# Patient Record
(demographics unavailable — no encounter records)

---

## 2024-12-18 NOTE — ASSESSMENT
[FreeTextEntry1] : #MAHA, TTP Dx 11.2024  - daughter noted over the last few days patient was not feeling well and complained of diarrhea, weakness, fatigue, worsening tremor, and progressive confusion  - no recent viral illness, reports eating raw fish on 11/10, and after developed diarrhea and progression of above symptoms. Reported consumption of tonic water ?exposure to quinine, ?drug induced  - Noted to have QUINCY on admission, labs consistent with MAHA and TTP  - Rest of work up done during hospital couse:  HIt Ab negative, dalton negative, C3 and C4 complement normal, b12 folate normal - [id number] <1 %, [id number]  inhibitor positive,  peripheral smear reviewed:+ schistocytes  - S/p eculizumab overnight on 11/15 for treatment of presumptive HUS, no further dose administered -PLEX around 13 sessions. Last session on 12.3.24  -s/p Rituximab 4cycles on 11/21, 11/28, 12/5, 12/14 -s/p methylprednisolone 1g X 3days f/by prednisone taper. Discharged from hospital with prednisone dose of 30mg -[id number]  12.11.24 49 on discharge     #Acute subacute infarcts: MRI reviewed:Small acute/subacute infarcts right centrum semiovale, right splenium  of the corpus callosum, left subinsular regio Remote hemorrhage left occipital lobe.Chronic inferior cerebellar infarction. Moderate microvascular ischemic changes Evaluated by neurology- likely 2/2 TTP, embolic to be considered as well.  Started on aspirin,statin by neurology, Has no focal deficits out-pt neurovascular clinic upon discharge   #Right ventricle linear density: Concern for vegetation in ECHO However KAM is negative  #Suspicion for cholecystitis on USG  HIDA scan negative, patient with no pain or tenderness on exam Elevated LFTs Outpatient lap lizzie with surgery  On po antibiotics   #Lung nodule - incidental  Ct chest 11.2024 :8 mm solid nodule, left lower lobe. Follow-up CT scan 6-12 months time.  #Lupus anticoagulant +  on DRVVT, neg silica clotting b2 glycoprotein, cardiolipin neg     Plan: taper prednisone to 25 mg from tomorrow till next clinic visit scheduled on  monday  Recommended to wait till 1 month prior to scheduling the surgery. F/up Dr Erickson  WIll refer to neurology  Will check hba1c, cbc, bmp, LFTs, iron ppanel, ferritin, LDH, RC  Repeat APS blood work in 3M  Repeat CT chest in 6-12M   RTC weekly for 1 month   SEEN/ examined w/HEMON[facility] fellow; note reviewed; case discussed; agree w/ plan 75 yo man with acute TTP , presenting to the hospital with acute renal failure and stroke, siezure, requiring plasma exchange course, rituxan, steroids, requiring over a month hospital stay in ICU. His plateletts count improved; his hemoglobin is imporved; kidney function has been stable. He completed 4 weekly rituxan infusions on 12/14/24; steroids started at 1mg/kg and now tapering as above; f/u in one week

## 2024-12-18 NOTE — HISTORY OF PRESENT ILLNESS
[ECOG Performance Status: 0 - Fully active, able to carry on all pre-disease performance without restriction] : Performance Status: 0 - Fully active, able to carry on all pre-disease performance without restriction [de-identified] : Mr Abraham is a 76M who presents for initial visit for new Dx of TTP at [facility].   Dx with TTP during hospitalization at [facility]. ADAMTS <1%, + inhibitor.  Received about 13 PLEX sessions, 4cycles of rituximab, stress dose steroids. Hospital course complicated by seizure requiring intubation to protect airway, acute/subacute stroke, Cholecystitis. Discharged with stable levels of plt, on prednisone taper.   Social history: no alcohol/drug use. IADL's  Family history: none significant  [de-identified] : 12.18.24 Mr Nj comes for initial visit after his recent dc from [facility] for TTP/MAHA. He is feeling well overall. He reports some exertional shortness of breath, fatigue.  CBC 12.18.24 : Hb 11.5,

## 2024-12-18 NOTE — REVIEW OF SYSTEMS
[Fatigue] : fatigue [Recent Change In Weight] : ~T recent weight change [SOB on Exertion] : shortness of breath during exertion [Negative] : Heme/Lymph [Fever] : no fever [Chills] : no chills [Night Sweats] : no night sweats [Wheezing] : no wheezing [Cough] : no cough [FreeTextEntry2] : weight loss  [FreeTextEntry6] : exertional shortness of breath

## 2024-12-23 NOTE — ASSESSMENT
[FreeTextEntry1] : #MAHA, TTP Dx 11.2024  - daughter noted over the last few days patient was not feeling well and complained of diarrhea, weakness, fatigue, worsening tremor, and progressive confusion  - no recent viral illness, reports eating raw fish on 11/10, and after developed diarrhea and progression of above symptoms. Reported consumption of tonic water ?exposure to quinine, ?drug induced  - Noted to have QUINCY on admission, labs consistent with MAHA and TTP  - Rest of work up done during hospital couse:  HIt Ab negative, dalton negative, C3 and C4 complement normal, b12 folate normal - [id number] <1 %, [id number]  inhibitor positive,  peripheral smear reviewed:+ schistocytes  - S/p eculizumab overnight on 11/15 for treatment of presumptive HUS, no further dose administered -PLEX around 13 sessions. Last session on 12.3.24  -s/p Rituximab 4cycles on 11/21, 11/28, 12/5, 12/14 -s/p methylprednisolone 1g X 3days f/by prednisone taper. Discharged from hospital with prednisone dose of 30mg -[id number]  12.11.24 49 on discharge     #Acute subacute infarcts: MRI reviewed:Small acute/subacute infarcts right centrum semiovale, right splenium  of the corpus callosum, left subinsular regio Remote hemorrhage left occipital lobe.Chronic inferior cerebellar infarction. Moderate microvascular ischemic changes Evaluated by neurology- likely 2/2 TTP, embolic to be considered as well.  Started on aspirin,statin by neurology, Has no focal deficits out-pt neurovascular clinic upon discharge   #Right ventricle linear density: Concern for vegetation in ECHO However KAM is negative  #Suspicion for cholecystitis on USG  HIDA scan negative, patient with no pain or tenderness on exam Elevated LFTs Outpatient lap lizzie with surgery  On po antibiotics   #Lung nodule - incidental  Ct chest 11.2024 :8 mm solid nodule, left lower lobe. Follow-up CT scan 6-12 months time.  #Lupus anticoagulant +  on DRVVT, neg silica clotting b2 glycoprotein, cardiolipin neg     Plan: taper prednisone to 25 mg from tomorrow till next clinic visit scheduled on  monday  Recommended to wait till 1 month prior to scheduling the surgery. F/up Dr Erickson  WIll refer to neurology  Will check hba1c, cbc, bmp, LFTs, iron ppanel, ferritin, LDH, RC  Repeat APS blood work in 3M  Repeat CT chest in 6-12M   RTC weekly for 1 month   75 yo man with acute TTP , presenting to the hospital with acute renal failure and stroke, siezure, requiring plasma exchange course, rituxan, steroids, requiring over a month hospital stay in ICU. His plateletts count improved; his hemoglobin is imporved; kidney function has been stable. He completed 4 weekly rituxan infusions on 12/14/24; steroids started at 1mg/kg and now tapering as above; f/u in one week 12/23/24: platelets are normal; GFR stable but decreased; needs to see PMD and nephrology to optimize BP and HR and volume uptake; continue tapering steroids

## 2024-12-23 NOTE — HISTORY OF PRESENT ILLNESS
[ECOG Performance Status: 0 - Fully active, able to carry on all pre-disease performance without restriction] : Performance Status: 0 - Fully active, able to carry on all pre-disease performance without restriction [de-identified] : Mr Abraham is a 76M who presents for initial visit for new Dx of TTP at [facility].   Dx with TTP during hospitalization at [facility]. ADAMTS <1%, + inhibitor.  Received about 13 PLEX sessions, 4cycles of rituximab, stress dose steroids. Hospital course complicated by seizure requiring intubation to protect airway, acute/subacute stroke, Cholecystitis. Discharged with stable levels of plt, on prednisone taper.   Social history: no alcohol/drug use. IADL's  Family history: none significant  [de-identified] : 12.18.24 Mr Nj comes for initial visit after his recent dc from [facility] for TTP/MAHA. He is feeling well overall. He reports some exertional shortness of breath, fatigue.  CBC 12.18.24 : Hb 11.5,    12/23/24

## 2024-12-23 NOTE — CONSULT LETTER
[Dear  ___] : Dear  [unfilled], [Consult Letter:] : I had the pleasure of evaluating your patient, [unfilled]. [Please see my note below.] : Please see my note below. [Sincerely,] : Sincerely, [FreeTextEntry3] : Jose Nam DO Attending Physician, Hematology/ Medical Oncology [phone] office

## 2024-12-30 NOTE — CONSULT LETTER
[Dear  ___] : Dear  [unfilled], [Consult Letter:] : I had the pleasure of evaluating your patient, [unfilled]. [Please see my note below.] : Please see my note below. [Sincerely,] : Sincerely, [FreeTextEntry3] : Jose Nam DO Attending Physician, Hematology/ Medical Oncology [phone] office

## 2024-12-30 NOTE — ASSESSMENT
[FreeTextEntry1] : #MAHA, TTP Dx 11.2024  - daughter noted over the last few days patient was not feeling well and complained of diarrhea, weakness, fatigue, worsening tremor, and progressive confusion  - no recent viral illness, reports eating raw fish on 11/10, and after developed diarrhea and progression of above symptoms. Reported consumption of tonic water ?exposure to quinine, ?drug induced  - Noted to have QUINCY on admission, labs consistent with MAHA and TTP  - Rest of work up done during hospital couse:  HIt Ab negative, dalton negative, C3 and C4 complement normal, b12 folate normal - [id number] <1 %, [id number]  inhibitor positive,  peripheral smear reviewed:+ schistocytes  - S/p eculizumab overnight on 11/15 for treatment of presumptive HUS, no further dose administered -PLEX around 13 sessions. Last session on 12.3.24  -s/p Rituximab 4cycles on 11/21, 11/28, 12/5, 12/14 -s/p methylprednisolone 1g X 3days f/by prednisone taper. Discharged from hospital with prednisone dose of 30mg -[id number]  12.11.24 49 on discharge     #Acute subacute infarcts: MRI reviewed:Small acute/subacute infarcts right centrum semiovale, right splenium  of the corpus callosum, left subinsular regio Remote hemorrhage left occipital lobe.Chronic inferior cerebellar infarction. Moderate microvascular ischemic changes Evaluated by neurology- likely 2/2 TTP, embolic to be considered as well.  Started on aspirin,statin by neurology, Has no focal deficits out-pt neurovascular clinic upon discharge   #Right ventricle linear density: Concern for vegetation in ECHO However KAM is negative  #Suspicion for cholecystitis on USG  HIDA scan negative, patient with no pain or tenderness on exam Elevated LFTs Outpatient lap lizzie with surgery  On po antibiotics   #Lung nodule - incidental  Ct chest 11.2024 :8 mm solid nodule, left lower lobe. Follow-up CT scan 6-12 months time.  #Lupus anticoagulant +  on DRVVT, neg silica clotting b2 glycoprotein, cardiolipin neg     Plan: taper prednisone to 25 mg from tomorrow till next clinic visit scheduled on  monday  Recommended to wait till 1 month prior to scheduling the surgery. F/up Dr Erickson  WIll refer to neurology  Will check hba1c, cbc, bmp, LFTs, iron ppanel, ferritin, LDH, RC  Repeat APS blood work in 3M  Repeat CT chest in 6-12M   RTC weekly for 1 month   12/30/24 1. 75 yo man with  TTP , presented to [facility] with acute renal failure and stroke, siezure, requiring plasma exchange course, s/p 4 weekly doses of rituxan completed 12/14/24;, steroids, requiring over a month hospital stay in ICU.  2. His platelets count improved/ NORMALIZED and remains so; 3.  his hemoglobin has improved; although the level today is lower than over the last week; in view of increased LDH , and in view of weakness today, would obtain full hemolysis panel today and in 3 days; pt has a f/u 1/2/25  4.  kidney function has been stable.  5.  steroids started at 1mg/kg and now tapering; the last daily dose 12/30/24 is 20 mg of prednisone; advised to decrease to daily dose of 15mg  and RTC 1/2/25 6. pt reports sneezing, tiredness which might raise suspicion of viral illness: this can cause decrease in any cell count, and mask worsening of TTP: therefore, next visit there has to be given a very strict f/u every 3-6 days with CBC, and hemolysis panel;

## 2024-12-30 NOTE — HISTORY OF PRESENT ILLNESS
[ECOG Performance Status: 0 - Fully active, able to carry on all pre-disease performance without restriction] : Performance Status: 0 - Fully active, able to carry on all pre-disease performance without restriction [de-identified] : Mr Abraham is a 76M who presents for initial visit for new Dx of TTP at [facility].   Dx with TTP during hospitalization at [facility]. ADAMTS <1%, + inhibitor.  Received about 13 PLEX sessions, 4cycles of rituximab, stress dose steroids. Hospital course complicated by seizure requiring intubation to protect airway, acute/subacute stroke, Cholecystitis. Discharged with stable levels of plt, on prednisone taper.   Social history: no alcohol/drug use. IADL's  Family history: none significant  [de-identified] : 12.18.24 Mr Nj comes for initial visit after his recent dc from [facility] for TTP/MAHA. He is feeling well overall. He reports some exertional shortness of breath, fatigue.  CBC 12.18.24 : Hb 11.5,    12/23/24

## 2024-12-30 NOTE — CONSULT LETTER
[Dear  ___] : Dear  [unfilled], [Consult Letter:] : I had the pleasure of evaluating your patient, [unfilled]. [Please see my note below.] : Please see my note below. [Sincerely,] : Sincerely, [FreeTextEntry3] : Jose Nam DO Attending Physician, Hematology/ Medical Oncology [phone] office

## 2024-12-30 NOTE — BEGINNING OF VISIT
[0] : 1) Little interest or pleasure doing things: Not at all (0) [1] : 2) Feeling down, depressed, or hopeless for several days (1) [PHQ-2 Negative] : PHQ-2 Negative [[id number]] : 1 [Pain Scale: ___] : On a scale of 1-10, today the patient's pain is a(n) [unfilled]. [Former] : Former [> 15 Years] : > 15 Years [Reviewed, no changes] : Reviewed, no changes

## 2024-12-30 NOTE — BEGINNING OF VISIT
[0] : 1) Little interest or pleasure doing things: Not at all (0) [1] : 2) Feeling down, depressed, or hopeless for several days (1) [PHQ-2 Negative] : PHQ-2 Negative [[id number]] : 1 [Pain Scale: ___] : On a scale of 1-10, today the patient's pain is a(n) [unfilled]. [Former] : Former [> 15 Years] : > 15 Years [Reviewed, no changes] : Reviewed, no changes

## 2024-12-30 NOTE — HISTORY OF PRESENT ILLNESS
[ECOG Performance Status: 0 - Fully active, able to carry on all pre-disease performance without restriction] : Performance Status: 0 - Fully active, able to carry on all pre-disease performance without restriction [de-identified] : Mr Abraham is a 76M who presents for initial visit for new Dx of TTP at [facility].   Dx with TTP during hospitalization at [facility]. ADAMTS <1%, + inhibitor.  Received about 13 PLEX sessions, 4cycles of rituximab, stress dose steroids. Hospital course complicated by seizure requiring intubation to protect airway, acute/subacute stroke, Cholecystitis. Discharged with stable levels of plt, on prednisone taper.   Social history: no alcohol/drug use. IADL's  Family history: none significant  [de-identified] : 12.18.24 Mr Nj comes for initial visit after his recent dc from [facility] for TTP/MAHA. He is feeling well overall. He reports some exertional shortness of breath, fatigue.  CBC 12.18.24 : Hb 11.5,    12/23/24

## 2024-12-30 NOTE — ASSESSMENT
[FreeTextEntry1] : #MAHA, TTP Dx 11.2024  - daughter noted over the last few days patient was not feeling well and complained of diarrhea, weakness, fatigue, worsening tremor, and progressive confusion  - no recent viral illness, reports eating raw fish on 11/10, and after developed diarrhea and progression of above symptoms. Reported consumption of tonic water ?exposure to quinine, ?drug induced  - Noted to have QUINCY on admission, labs consistent with MAHA and TTP  - Rest of work up done during hospital couse:  HIt Ab negative, dalton negative, C3 and C4 complement normal, b12 folate normal - [id number] <1 %, [id number]  inhibitor positive,  peripheral smear reviewed:+ schistocytes  - S/p eculizumab overnight on 11/15 for treatment of presumptive HUS, no further dose administered -PLEX around 13 sessions. Last session on 12.3.24  -s/p Rituximab 4cycles on 11/21, 11/28, 12/5, 12/14 -s/p methylprednisolone 1g X 3days f/by prednisone taper. Discharged from hospital with prednisone dose of 30mg -[id number]  12.11.24 49 on discharge     #Acute subacute infarcts: MRI reviewed:Small acute/subacute infarcts right centrum semiovale, right splenium  of the corpus callosum, left subinsular regio Remote hemorrhage left occipital lobe.Chronic inferior cerebellar infarction. Moderate microvascular ischemic changes Evaluated by neurology- likely 2/2 TTP, embolic to be considered as well.  Started on aspirin,statin by neurology, Has no focal deficits out-pt neurovascular clinic upon discharge   #Right ventricle linear density: Concern for vegetation in ECHO However KAM is negative  #Suspicion for cholecystitis on USG  HIDA scan negative, patient with no pain or tenderness on exam Elevated LFTs Outpatient lap lizzie with surgery  On po antibiotics   #Lung nodule - incidental  Ct chest 11.2024 :8 mm solid nodule, left lower lobe. Follow-up CT scan 6-12 months time.  #Lupus anticoagulant +  on DRVVT, neg silica clotting b2 glycoprotein, cardiolipin neg     Plan: taper prednisone to 25 mg from tomorrow till next clinic visit scheduled on  monday  Recommended to wait till 1 month prior to scheduling the surgery. F/up Dr Erickson  WIll refer to neurology  Will check hba1c, cbc, bmp, LFTs, iron ppanel, ferritin, LDH, RC  Repeat APS blood work in 3M  Repeat CT chest in 6-12M   RTC weekly for 1 month   12/30/24 1. 75 yo man with  TTP , presented to [facility] with acute renal failure and stroke, siezure, requiring plasma exchange course, s/p 4 weekly doses of rituxan completed 12/14/24;, steroids, requiring over a month hospital stay in ICU.  2. His platelets count improved/ NORMALIZED and remains so; 3.  his hemoglobin has improved; although the level today is lower than over the last week; in view of increased LDH , and in view of weakness today, would obtain full hemolysis panel today and in 3 days; pt has a f/u 1/2/25  4.  kidney function has been stable.  5.  steroids started at 1mg/kg and now tapering; the last daily dose 12/30/24 is 20 mg of prednisone; advised to decrease to daily dose of 15mg  and RTC 1/2/25 6. pt reports sneezing, tiredness which might raise suspicion of viral illness: this can cause decrease in any cell count, and mask worsening of TTP: therefore, next visit there has to be given a very strict f/u every 3-6 days with CBC, and hemolysis panel;

## 2024-12-31 NOTE — CONSULT LETTER
[Dear  ___] : Dear  [unfilled], [Consult Letter:] : I had the pleasure of evaluating your patient, [unfilled]. [Please see my note below.] : Please see my note below. [Sincerely,] : Sincerely, [FreeTextEntry3] : Jose Nam DO Attending Physician, Hematology/ Medical Oncology [phone] office

## 2024-12-31 NOTE — REVIEW OF SYSTEMS
[Fever] : no fever [Chills] : no chills [Night Sweats] : no night sweats [Fatigue] : fatigue [Recent Change In Weight] : ~T recent weight change [Wheezing] : no wheezing [Cough] : no cough [SOB on Exertion] : shortness of breath during exertion [Negative] : Heme/Lymph [FreeTextEntry2] : weight loss  [FreeTextEntry6] : exertional shortness of breath

## 2025-01-06 NOTE — HISTORY OF PRESENT ILLNESS
[FreeTextEntry1] : 77 yo M w/ PMHx of HTN, HLD, CKD3, refractory TTP, stroke, presents for hospital follow up. Patient was hospitalized in Nov 2024 for refractory TTP s/p PLEX, rituximab. Patient initially presented with generalized tonic clonic seizure and was started on Keppra 500mg BID. vEEG was negative for epileptiform activities. He had an MRI brain obtained in hospital showed acute/subacute infarct in R centrum semiovale, corpus collosum and was started on Aspirin and Lipitor.  Patient used to have HTN and tremor, for which he was on metoprolol. Has been having tremor for about 3 years, right hand only. After this hospitalization, BP dropped, metoprolol was discontinued. Restarted metoprolol 25mg QD for the last 10 days. Patient is compliant ASA 81mg QD, Lipitor 20mg QHS, Keppra 500mg BID. Patient is also on prednisone taper for TTP treatment. s/p PT. Lives with wife, able to perform daily activity independently.  Patient today complains of the hand tremor prominent in the right hand, bothering him when he holds things and ask if he needs additional management. Daughter noticed patient was moving and vocalizing in his sleep. Patient has some trouble writing, hand writing is smaller than before. Movement is slower than before. Denies abnormal taste or smell or falls. Denies heavy metal exposure. Denies FMHx of Parkinson disease.

## 2025-01-06 NOTE — CONSULT LETTER
[FreeTextEntry3] : Jose Nam DO Attending Physician, Hematology/ Medical Oncology [phone] office

## 2025-01-06 NOTE — PHYSICAL EXAM
[General Appearance - Alert] : alert [General Appearance - In No Acute Distress] : in no acute distress [Oriented To Time, Place, And Person] : oriented to person, place, and time [Person] : oriented to person [Place] : oriented to place [Time] : oriented to time [Cranial Nerves Optic (II)] : visual acuity intact bilaterally,  visual fields full to confrontation, pupils equal round and reactive to light [Cranial Nerves Trigeminal (V)] : facial sensation intact symmetrically [Cranial Nerves Oculomotor (III)] : extraocular motion intact [Cranial Nerves Facial (VII)] : face symmetrical [Cranial Nerves Vestibulocochlear (VIII)] : hearing was intact bilaterally [Cranial Nerves Glossopharyngeal (IX)] : tongue and palate midline [Cranial Nerves Accessory (XI - Cranial And Spinal)] : head turning and shoulder shrug symmetric [Cranial Nerves Hypoglossal (XII)] : there was no tongue deviation with protrusion [Motor Strength] : muscle strength was normal in all four extremities [No Muscle Atrophy] : normal bulk in all four extremities [Sensation Tactile Decrease] : light touch was intact [Balance] : balance was intact [Tremor] : a tremor present [2+] : Ankle jerk left 2+ [PERRL With Normal Accommodation] : pupils were equal in size, round, reactive to light, with normal accommodation [Extraocular Movements] : extraocular movements were intact [Hearing Threshold Finger Rub Not Wilson] : hearing was normal [Neck Appearance] : the appearance of the neck was normal [] : no respiratory distress [Heart Rate And Rhythm] : heart rate was normal and rhythm regular [Abdomen Soft] : soft [Skin Turgor] : normal skin turgor [Affect] : the affect was normal [Fluency] : fluency intact [Comprehension] : comprehension intact [FreeTextEntry5] : Hypophonia, hypomimia [FreeTextEntry6] : Resting tremor and re-emergence tremor on the right hand. Mild rigidity at left elbow/wrist. Slow tapping of the left hand and left foot. Strength 5/5 throughout. [FreeTextEntry8] : Decreased arm swing on the left arm while walking. Gait narrow based, 2 step turn.

## 2025-01-06 NOTE — REVIEW OF SYSTEMS
[Fever] : no fever [Chills] : no chills [Night Sweats] : no night sweats [Wheezing] : no wheezing [Cough] : no cough [FreeTextEntry2] : weight loss  [FreeTextEntry6] : exertional shortness of breath

## 2025-01-06 NOTE — REVIEW OF SYSTEMS
[Fever] : no fever [Chills] : no chills [Night Sweats] : no night sweats [Wheezing] : no wheezing [Cough] : no cough [FreeTextEntry6] : exertional shortness of breath  [FreeTextEntry2] : weight loss

## 2025-01-06 NOTE — ASSESSMENT
[FreeTextEntry1] : #MAHA, TTP Dx 11.2024  - daughter noted over the last few days patient was not feeling well and complained of diarrhea, weakness, fatigue, worsening tremor, and progressive confusion  - no recent viral illness, reports eating raw fish on 11/10, and after developed diarrhea and progression of above symptoms. Reported consumption of tonic water ?exposure to quinine, ?drug induced  - Noted to have QUINCY on admission, labs consistent with MAHA and TTP  - Rest of work up done during hospital couse:  HIt Ab negative, dalton negative, C3 and C4 complement normal, b12 folate normal - [id number] <1 %, [id number]  inhibitor positive,  peripheral smear reviewed:+ schistocytes  - S/p eculizumab overnight on 11/15 for treatment of presumptive HUS, no further dose administered -PLEX around 13 sessions. Last session on 12.3.24  - s/p Rituximab 4cycles on 11/21, 11/28, 12/5, 12/14 - s/p methylprednisolone 1g X 3days f/by prednisone taper. Discharged from hospital with prednisone dose of 30mg - [id number]  12.11.24 49 on discharge   - c/w prednisone 15mg daily along with PPI    # Acute subacute infarcts: - MRI reviewed:Small acute/subacute infarcts right centrum semiovale, right splenium of the corpus callosum, left subinsular regio Remote hemorrhage left occipital lobe.Chronic inferior cerebellar infarction. Moderate microvascular ischemic changes - Evaluated by neurology- likely 2/2 TTP, embolic to be considered as well.  - Started on aspirin,statin by neurology, Has no focal deficits - WIll refer to neurology   # Right ventricle linear density: - Concern for vegetation in ECHO - KAM is negative  # Suspicion for cholecystitis on USG  - HIDA scan negative, patient with no pain or tenderness on exam - Elevated LFTs - Outpatient lap lizzie with surgery   # Lung nodule - incidental  - Ct chest 11.2024 :8 mm solid nodule, left lower lobe. - Follow-up CT scan 6-12 months time.  # Lupus anticoagulant +  - on DRVVT, neg silica clotting - b2 glycoprotein, cardiolipin neg   - plan to Repeat APS blood work in    Recommended to wait till 1 month prior to scheduling the surgery. F/up Dr Erickson   RTC in 1 week with CBC, BMP, LFTs, retic count, haptoglobin level   Case reviewed with Dr. Quezada, who agrees with plan of care.   12/30/24 1. 75 yo man with  TTP , presented to [facility] with acute renal failure and stroke, seizure, requiring plasma exchange course, s/p 4 weekly doses of rituxan completed 12/14/24;, steroids, requiring over a month hospital stay in ICU.  2. His platelets count improved/ NORMALIZED and remains so; 3.  his hemoglobin has improved; although the level today is lower than over the last week; in view of increased LDH , and in view of weakness today, would obtain full hemolysis panel today and in 3 days; pt has a f/u 1/2/25  4.  kidney function has been stable.  5.  steroids started at 1mg/kg and now tapering; the last daily dose 12/30/24 is 20 mg of prednisone; advised to decrease to daily dose of 15mg  and RTC 1/2/25 6. pt reports sneezing, tiredness which might raise suspicion of viral illness: this can cause decrease in any cell count, and mask worsening of TTP: therefore, next visit there has to be given a very strict f/u every 3-6 days with CBC, and hemolysis panel

## 2025-01-06 NOTE — HISTORY OF PRESENT ILLNESS
[de-identified] : Mr Abraham is a 76M who presents for initial visit for new Dx of TTP at [facility].   Dx with TTP during hospitalization at [facility]. ADAMTS <1%, + inhibitor.  Received about 13 PLEX sessions, 4cycles of rituximab, stress dose steroids. Hospital course complicated by seizure requiring intubation to protect airway, acute/subacute stroke, Cholecystitis. Discharged with stable levels of plt, on prednisone taper.   Social history: no alcohol/drug use. IADL's  Family history: none significant  [de-identified] : 12.18.24 Mr Nj comes for initial visit after his recent dc from [facility] for TTP/MAHA. He is feeling well overall. He reports some exertional shortness of breath, fatigue.  CBC 12.18.24 : Hb 11.5,    1/6/25 Patient is here for a follow-up visit for TTP/MAHA.  He was admitted last week due to acute abdominal discomfort.  He recieved a HIDA scan on 01/02 which demonstrated normal gallbladder biliary tree and intestinal activity. Surgery was consulted, and are not recommending inpatient cholecystectomy. Pt is to follow-up o/p for CCY.   He was discharged two days ago on prednisone at 15mg daily again (received prednisone at 50mg then 20mg in the hospital).  Reviewed most recent CBC, which shows hgb 12.1g/dL + PLTs 260,000.  Patient denies fever, chills, nausea, vomiting, dyspnea, abdominal discomfort or bleeding.

## 2025-01-06 NOTE — HISTORY OF PRESENT ILLNESS
[de-identified] : Mr Abraham is a 76M who presents for initial visit for new Dx of TTP at [facility].   Dx with TTP during hospitalization at [facility]. ADAMTS <1%, + inhibitor.  Received about 13 PLEX sessions, 4cycles of rituximab, stress dose steroids. Hospital course complicated by seizure requiring intubation to protect airway, acute/subacute stroke, Cholecystitis. Discharged with stable levels of plt, on prednisone taper.   Social history: no alcohol/drug use. IADL's  Family history: none significant  [de-identified] : 12.18.24 Mr Nj comes for initial visit after his recent dc from [facility] for TTP/MAHA. He is feeling well overall. He reports some exertional shortness of breath, fatigue.  CBC 12.18.24 : Hb 11.5,    1/6/25 Patient is here for a follow-up visit for TTP/MAHA.  He was admitted last week due to acute abdominal discomfort.  He recieved a HIDA scan on 01/02 which demonstrated normal gallbladder biliary tree and intestinal activity. Surgery was consulted, and are not recommending inpatient cholecystectomy. Pt is to follow-up o/p for CCY.   He was discharged two days ago on prednisone at 15mg daily again (received prednisone at 50mg then 20mg in the hospital).  Reviewed most recent CBC, which shows hgb 12.1g/dL + PLTs 260,000.  Patient denies fever, chills, nausea, vomiting, dyspnea, abdominal discomfort or bleeding.

## 2025-01-06 NOTE — END OF VISIT
[Time Spent: ___ minutes] : I have spent [unfilled] minutes of time on the encounter which excludes teaching and separately reported services. [facility]

## 2025-01-06 NOTE — ASSESSMENT
[FreeTextEntry1] : #MAHA, TTP Dx 11.2024  - daughter noted over the last few days patient was not feeling well and complained of diarrhea, weakness, fatigue, worsening tremor, and progressive confusion  - no recent viral illness, reports eating raw fish on 11/10, and after developed diarrhea and progression of above symptoms. Reported consumption of tonic water ?exposure to quinine, ?drug induced  - Noted to have QUINCY on admission, labs consistent with MAHA and TTP  - Rest of work up done during hospital couse:  HIt Ab negative, dalton negative, C3 and C4 complement normal, b12 folate normal - [id number] <1 %, [id number]  inhibitor positive,  peripheral smear reviewed:+ schistocytes  - S/p eculizumab overnight on 11/15 for treatment of presumptive HUS, no further dose administered -PLEX around 13 sessions. Last session on 12.3.24  - s/p Rituximab 4cycles on 11/21, 11/28, 12/5, 12/14 - s/p methylprednisolone 1g X 3days f/by prednisone taper. Discharged from hospital with prednisone dose of 30mg - [id number]  12.11.24 49 on discharge   - c/w prednisone 15mg daily along with PPI    # Acute subacute infarcts: - MRI reviewed:Small acute/subacute infarcts right centrum semiovale, right splenium of the corpus callosum, left subinsular regio Remote hemorrhage left occipital lobe.Chronic inferior cerebellar infarction. Moderate microvascular ischemic changes - Evaluated by neurology- likely 2/2 TTP, embolic to be considered as well.  - Started on aspirin,statin by neurology, Has no focal deficits - WIll refer to neurology   # Right ventricle linear density: - Concern for vegetation in ECHO - KAM is negative  # Suspicion for cholecystitis on USG  - HIDA scan negative, patient with no pain or tenderness on exam - Elevated LFTs - Outpatient lap lizzie with surgery   # Lung nodule - incidental  - Ct chest 11.2024 :8 mm solid nodule, left lower lobe. - Follow-up CT scan 6-12 months time.  # Lupus anticoagulant +  - on DRVVT, neg silica clotting - b2 glycoprotein, cardiolipin neg   - plan to Repeat APS blood work in    Recommended to wait till 1 month prior to scheduling the surgery. F/up Dr Erickson   RTC in 1 week with CBC, BMP, LFTs, retic count, haptoglobin level   Case reviewed with Dr. Quezada, who agrees with plan of care.   12/30/24 1. 77 yo man with  TTP , presented to [facility] with acute renal failure and stroke, seizure, requiring plasma exchange course, s/p 4 weekly doses of rituxan completed 12/14/24;, steroids, requiring over a month hospital stay in ICU.  2. His platelets count improved/ NORMALIZED and remains so; 3.  his hemoglobin has improved; although the level today is lower than over the last week; in view of increased LDH , and in view of weakness today, would obtain full hemolysis panel today and in 3 days; pt has a f/u 1/2/25  4.  kidney function has been stable.  5.  steroids started at 1mg/kg and now tapering; the last daily dose 12/30/24 is 20 mg of prednisone; advised to decrease to daily dose of 15mg  and RTC 1/2/25 6. pt reports sneezing, tiredness which might raise suspicion of viral illness: this can cause decrease in any cell count, and mask worsening of TTP: therefore, next visit there has to be given a very strict f/u every 3-6 days with CBC, and hemolysis panel

## 2025-01-06 NOTE — ASSESSMENT
[FreeTextEntry1] : 77 yo M w/ PMHx of HTN, HLD, CKD3, refractory TTP, stroke presents for hospital follow up. Recent hospitalization in Nov 2024 for refractory TTP s/p PLEX, rituximab, now on prednisone taper. Presents for f/u of seizure during this hospitalization, as well strokes seen on MRI.   # Incidental punctate to small ischemic infarcts: multivessel territories. Likely related to TTP. NIHSS 0, mRS 0, compliant with meds - Continue Aspirin 81mg QD and Lipitor 20mg QHS  # Seizure: GTC in the setting of TTP, no recurrence since. vEEG negative.  - Continue Keppra 500mg BID - Obtain Keppra level, CMP, Mg  #Parkinson's disease: bradykinesia, left sided rigidity, right hand resting tremor, hypomimia and hypophonia. History also suggestive of REM sleep behavior disorder.  - Start carbidopa-levadopa 25-100mg BID  RTC 4 mo

## 2025-01-06 NOTE — CONSULT LETTER
[FreeTextEntry3] : Jose Nam DO Attending Physician, Hematology/ Medical Oncology [phone] office

## 2025-01-13 NOTE — ASSESSMENT
[FreeTextEntry1] : #MAHA, TTP Dx 11.2024  - daughter noted over the last few days patient was not feeling well and complained of diarrhea, weakness, fatigue, worsening tremor, and progressive confusion  - no recent viral illness, reports eating raw fish on 11/10, and after developed diarrhea and progression of above symptoms. Reported consumption of tonic water ?exposure to quinine, ?drug induced  - Noted to have QUINCY on admission, labs consistent with MAHA and TTP  - Rest of work up done during hospital couse:  HIt Ab negative, dalton negative, C3 and C4 complement normal, b12 folate normal - [id number] <1 %, [id number]  inhibitor positive,  peripheral smear reviewed:+ schistocytes  - S/p eculizumab overnight on 11/15 for treatment of presumptive HUS, no further dose administered -PLEX around 13 sessions. Last session on 12.3.24  - s/p Rituximab 4cycles on 11/21, 11/28, 12/5, 12/14 - s/p methylprednisolone 1g X 3days f/by prednisone taper. Discharged from hospital with prednisone dose of 30mg - [id number]  12.11.24 49 on discharge   - c/w prednisone 15mg daily along with PPI    # Acute subacute infarcts: - MRI reviewed:Small acute/subacute infarcts right centrum semiovale, right splenium of the corpus callosum, left subinsular regio Remote hemorrhage left occipital lobe.Chronic inferior cerebellar infarction. Moderate microvascular ischemic changes - Evaluated by neurology- likely 2/2 TTP, embolic to be considered as well.  - Started on aspirin,statin by neurology, Has no focal deficits - WIll refer to neurology   # Right ventricle linear density: - Concern for vegetation in ECHO - KAM is negative  # Suspicion for cholecystitis on USG  - HIDA scan negative, patient with no pain or tenderness on exam - Elevated LFTs - Outpatient lap lizzie with surgery   # Lung nodule - incidental  - Ct chest 11.2024 :8 mm solid nodule, left lower lobe. - Follow-up CT scan 6-12 months time.  # Lupus anticoagulant +  - on DRVVT, neg silica clotting - b2 glycoprotein, cardiolipin neg   - plan to Repeat APS blood work in    Recommended to wait till 1 month prior to scheduling the surgery. F/up Dr Erickson   RTC in 1 week with CBC, BMP, LFTs, retic count, haptoglobin level      1/13/25  1. 77 yo man with  TTP , presented to [facility] with acute renal failure and stroke, seizure, requiring plasma exchange course, s/p 4 weekly doses of rituxan completed 12/14/24;, steroids, requiring over a month hospital stay in ICU.  2. His platelets count improved/ NORMALIZED and remains so; 3.  his hemoglobin has improved; decrease prednisone by 5mg beginning 1/13/25 and repeat CBC every 3 days; f/u in one week  4.  kidney function has been stable.  5. gall bladder stones; pt is pending to see a surgeon to coordinate the timing of the operation

## 2025-01-13 NOTE — HISTORY OF PRESENT ILLNESS
[ECOG Performance Status: 0 - Fully active, able to carry on all pre-disease performance without restriction] : Performance Status: 0 - Fully active, able to carry on all pre-disease performance without restriction [de-identified] : Mr Abraham is a 76M who presents for initial visit for new Dx of TTP at [facility].   Dx with TTP during hospitalization at [facility]. ADAMTS <1%, + inhibitor.  Received about 13 PLEX sessions, 4cycles of rituximab, stress dose steroids. Hospital course complicated by seizure requiring intubation to protect airway, acute/subacute stroke, Cholecystitis. Discharged with stable levels of plt, on prednisone taper.   Social history: no alcohol/drug use. IADL's  Family history: none significant  [de-identified] : 12.18.24 Mr Nj comes for initial visit after his recent dc from [facility] for TTP/MAHA. He is feeling well overall. He reports some exertional shortness of breath, fatigue.  CBC 12.18.24 : Hb 11.5,    1/6/25 Patient is here for a follow-up visit for TTP/MAHA.  He was admitted last week due to acute abdominal discomfort.  He recieved a HIDA scan on 01/02 which demonstrated normal gallbladder biliary tree and intestinal activity. Surgery was consulted, and are not recommending inpatient cholecystectomy. Pt is to follow-up o/p for CCY.   He was discharged two days ago on prednisone at 15mg daily again (received prednisone at 50mg then 20mg in the hospital).  Reviewed most recent CBC, which shows hgb 12.1g/dL + PLTs 260,000.  Patient denies fever, chills, nausea, vomiting, dyspnea, abdominal discomfort or bleeding.  1/13/25

## 2025-01-13 NOTE — CONSULT LETTER
[Dear  ___] : Dear  [unfilled], [Consult Letter:] : I had the pleasure of evaluating your patient, [unfilled]. [Please see my note below.] : Please see my note below. [Sincerely,] : Sincerely, [FreeTextEntry3] : Jose Nam DO Attending Physician, Hematology/ Medical Oncology [phone] office

## 2025-01-22 NOTE — HISTORY OF PRESENT ILLNESS
[de-identified] : Mr. YESENIA TRAMMELL is a 76 year  old man. He presented to the office for the first time on 01/22/2025 , his primary is TAVON WILLSON . admitted withe severe life threaening complex ttp  was having ruq pain  us showed gallstones  hida was negattive  was readmittd and all imaging us / ct and hida was negative i think he just had a systemic infalmaatoin and that has resoved but he does having ruq pain and tenderness  worsend on feed likley biliary colic

## 2025-01-22 NOTE — ASSESSMENT
[FreeTextEntry1] : Mr. YESENIA TRAMMELL is a 76 year  old man. He presented to the office for the first time on 01/22/2025 , his primary is TAVON WILLSON . admitted withe severe life threaening complex ttp  was having ruq pain  us showed gallstones  hida was negattive  was readmittd and all imaging us / ct and hida was negative i think he just had a systemic infalmaatoin and that has resoved but he does having ruq pain and tenderness  worsend on feed likley biliary colic   imrpession : would offer him lap lizzie - once deemed stable by onc consented but dr mulligan is going to see him tomorrow and call me about timing of surgery   We explained in great detail the pathophysiology of the disease process. We discussed the workup for diagnosis and management. The Post operative care was explained to the patient. He was counselled on diet , exercise and wound care. The Procedure was explained to the patient. The Risk , benefit and alternatives were discussed. We discussed recovery and possible complications. We discussed complications including sever complications like injury of the surrounding organs like the bile duct. We discussed about the benefits and disadvantage of converting the laparoscopic surgery to open. We also discussed about post cholecystectomy syndrome and  symptom management after surgery. We discussed for over 45  min, including her present medical conditions, medications and if they need to be changed, the medications she is on and various surgical and non-surgical options. The Risk, benefit and alternatives were discussed. We discussed recovery and possible complications. his radiological images and labs were independently reviewed in the PACS by me. The Images were reviewed with him .   We discussed the importance of close follow up. We informed that he needs to follow up in or or after meeting onc . We also informed that he can call us if anything changes or has any questions.     Elke Erickson MD Minimally Invasive Surgery Foregut and [facility]  [facility]. [address] , [address] [place] [address] Phone: [phone] Fax: [phone]

## 2025-01-22 NOTE — PHYSICAL EXAM
[JVD] : no jugular venous distention  [de-identified] : Normal  [de-identified] : Normal  [de-identified] : Normal  irby positive - ruq tenderness

## 2025-01-27 NOTE — REVIEW OF SYSTEMS
[Fatigue] : fatigue [Recent Change In Weight] : ~T recent weight change [SOB on Exertion] : shortness of breath during exertion [Negative] : Heme/Lymph [Fever] : no fever [Chills] : no chills [Night Sweats] : no night sweats [Wheezing] : no wheezing [Cough] : no cough [Abdominal Pain] : no abdominal pain [FreeTextEntry2] : weight loss  [FreeTextEntry6] : exertional shortness of breath

## 2025-01-27 NOTE — CONSULT LETTER
[Dear  ___] : Dear  [unfilled], [Consult Letter:] : I had the pleasure of evaluating your patient, [unfilled]. [Please see my note below.] : Please see my note below. [Sincerely,] : Sincerely, [FreeTextEntry3] : Jose Nam DO Attending Physician, Hematology/ Medical Oncology [phone] office

## 2025-01-27 NOTE — ASSESSMENT
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 Hospitalization course was complicated by stroke, seizure, QUINCY, cholecysitis. - [id number] <1 %, [id number]  inhibitor positive - S/p eculizumab overnight on 11/15 for treatment of presumptive HUS, no further dose administered -PLEX around 13 sessions. Last session on 12.3.24  - s/p Rituximab 4cycles on 11/21, 11/28, 12/5, 12/14 - s/p methylprednisolone 1g X 3days during hospitalization followed by taper - [id number]  1/3 is 96 Currently on prednisone 10 mg (for last 9 days)  # Acute subacute infarcts: - MRI reviewed:Small acute/subacute infarcts right centrum semiovale, right splenium of the corpus callosum, left subinsular regio Remote hemorrhage left occipital lobe.Chronic inferior cerebellar infarction. Moderate microvascular ischemic changes - Evaluated by neurology- likely 2/2 TTP, embolic to be considered as well.  - Started on aspirin,statin by neurology, Has no focal deficits Follow up with neurology  # Right ventricle linear density: - Concern for vegetation in ECHO - KAM is negative  # Suspicion for cholecystitis on USG  - HIDA scan negative, patient with no pain or tenderness on exam - Elevated LFTs - Outpatient lap lizzie with surgery   # Lung nodule - incidental  - Ct chest 11.2024 :8 mm solid nodule, left lower lobe. - Follow-up CT scan 6-12 months time.  # Lupus anticoagulant +  - on DRVVT, neg silica clotting - b2 glycoprotein, cardiolipin neg   - plan to Repeat APS blood work in    Labs reviewed, platelet remains stable, ADAMTS 13: 96 on Bravo 3 Continue with current dose of steroid 10 mg until surgery (wouldn't taper for now) GB surgery is planned

## 2025-01-27 NOTE — HISTORY OF PRESENT ILLNESS
[ECOG Performance Status: 0 - Fully active, able to carry on all pre-disease performance without restriction] : Performance Status: 0 - Fully active, able to carry on all pre-disease performance without restriction [de-identified] : Mr Abrahma is a 76M who presents for initial visit for new Dx of TTP at [facility].   Dx with TTP during hospitalization at [facility]. ADAMTS <1%, + inhibitor.  Received about 13 PLEX sessions, 4cycles of rituximab, stress dose steroids. Hospital course complicated by seizure requiring intubation to protect airway, acute/subacute stroke, Cholecystitis. Discharged with stable levels of plt, on prednisone taper.   Social history: no alcohol/drug use. IADL's  Family history: none significant  [de-identified] : 12.18.24 Mr Nj comes for initial visit after his recent dc from [facility] for TTP/MAHA. He is feeling well overall. He reports some exertional shortness of breath, fatigue.  CBC 12.18.24 : Hb 11.5,    1/6/25 Patient is here for a follow-up visit for TTP/MAHA.  He was admitted last week due to acute abdominal discomfort.  He recieved a HIDA scan on 01/02 which demonstrated normal gallbladder biliary tree and intestinal activity. Surgery was consulted, and are not recommending inpatient cholecystectomy. Pt is to follow-up o/p for CCY.   He was discharged two days ago on prednisone at 15mg daily again (received prednisone at 50mg then 20mg in the hospital).  Reviewed most recent CBC, which shows hgb 12.1g/dL + PLTs 260,000.  Patient denies fever, chills, nausea, vomiting, dyspnea, abdominal discomfort or bleeding.  1/22/25: Overall he is doing fine. Stil has some RUQ tenderness on deep palpation. Was seen by Surgery earlier today and is planning for cholecystectomy. Mental status remains stable.   1/27/25

## 2025-01-31 NOTE — HISTORY OF PRESENT ILLNESS
[ECOG Performance Status: 0 - Fully active, able to carry on all pre-disease performance without restriction] : Performance Status: 0 - Fully active, able to carry on all pre-disease performance without restriction [de-identified] : Mr Abraham is a 76M who presents for initial visit for new Dx of TTP at [facility].   Dx with TTP during hospitalization at [facility]. ADAMTS <1%, + inhibitor.  Received about 13 PLEX sessions, 4cycles of rituximab, stress dose steroids. Hospital course complicated by seizure requiring intubation to protect airway, acute/subacute stroke, Cholecystitis. Discharged with stable levels of plt, on prednisone taper.   Social history: no alcohol/drug use. IADL's  Family history: none significant  [de-identified] : 12.18.24 Mr Nj comes for initial visit after his recent dc from [facility] for TTP/MAHA. He is feeling well overall. He reports some exertional shortness of breath, fatigue.  CBC 12.18.24 : Hb 11.5,    1/6/25 Patient is here for a follow-up visit for TTP/MAHA.  He was admitted last week due to acute abdominal discomfort.  He recieved a HIDA scan on 01/02 which demonstrated normal gallbladder biliary tree and intestinal activity. Surgery was consulted, and are not recommending inpatient cholecystectomy. Pt is to follow-up o/p for CCY.   He was discharged two days ago on prednisone at 15mg daily again (received prednisone at 50mg then 20mg in the hospital).  Reviewed most recent CBC, which shows hgb 12.1g/dL + PLTs 260,000.  Patient denies fever, chills, nausea, vomiting, dyspnea, abdominal discomfort or bleeding.  1/22/25: Overall he is doing fine. Stil has some RUQ tenderness on deep palpation. Was seen by Surgery earlier today and is planning for cholecystectomy. Mental status remains stable.

## 2025-01-31 NOTE — ASSESSMENT
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 Hospitalization course was complicated by stroke, seizure, QUINCY, cholecysitis. - [id number] <1 %, [id number]  inhibitor positive - S/p eculizumab overnight on 11/15 for treatment of presumptive HUS, no further dose administered -PLEX around 13 sessions. Last session on 12.3.24  - s/p Rituximab 4cycles on 11/21, 11/28, 12/5, 12/14 - s/p methylprednisolone 1g X 3days during hospitalization followed by taper - [id number]  1/3 is 96 Currently on prednisone 10 mg (for last 9 days)  # Acute subacute infarcts: - MRI reviewed:Small acute/subacute infarcts right centrum semiovale, right splenium of the corpus callosum, left subinsular regio Remote hemorrhage left occipital lobe.Chronic inferior cerebellar infarction. Moderate microvascular ischemic changes - Evaluated by neurology- likely 2/2 TTP, embolic to be considered as well.  - Started on aspirin,statin by neurology, Has no focal deficits Follow up with neurology  # Right ventricle linear density: - Concern for vegetation in ECHO - KAM is negative  # Suspicion for cholecystitis on USG  - HIDA scan negative, patient with no pain or tenderness on exam - Elevated LFTs - Outpatient lap lizzie with surgery   # Lung nodule - incidental  - Ct chest 11.2024 :8 mm solid nodule, left lower lobe. - Follow-up CT scan 6-12 months time.  # Lupus anticoagulant +  - on DRVVT, neg silica clotting - b2 glycoprotein, cardiolipin neg   - plan to Repeat APS blood work in    Labs reviewed, platelet remains stable, ADAMTS 13: 96 on Bravo 3 Continue with current dose of steroid 10 mg until surgery (wouldn't taper for now) Will discuss with Dr. Heard and Surgery regarding the timing of the surgery. Given he still has RUQ tenderness, would not wait too long for the surgery.

## 2025-01-31 NOTE — CONSULT LETTER
[Dear  ___] : Dear  [unfilled], [Consult Letter:] : I had the pleasure of evaluating your patient, [unfilled]. [Please see my note below.] : Please see my note below. [Sincerely,] : Sincerely, [FreeTextEntry3] : Jose Nam DO Attending Physician, Hematology/ Medical Oncology [phone] office

## 2025-01-31 NOTE — ASSESSMENT
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 Hospitalization course was complicated by stroke, seizure, QUINCY, cholecysitis. - [id number] <1 %, [id number]  inhibitor positive - S/p eculizumab overnight on 11/15 for treatment of presumptive HUS, no further dose administered -PLEX around 13 sessions. Last session on 12.3.24  - s/p Rituximab 4cycles on 11/21, 11/28, 12/5, 12/14 - s/p methylprednisolone 1g X 3days during hospitalization followed by taper - [id number]  1/3 is 96 Currently on prednisone 10 mg (for last 9 days)  # Acute subacute infarcts: - MRI reviewed:Small acute/subacute infarcts right centrum semiovale, right splenium of the corpus callosum, left subinsular regio Remote hemorrhage left occipital lobe.Chronic inferior cerebellar infarction. Moderate microvascular ischemic changes - Evaluated by neurology- likely 2/2 TTP, embolic to be considered as well.  - Started on aspirin,statin by neurology, Has no focal deficits Follow up with neurology  # Right ventricle linear density: - Concern for vegetation in ECHO - KAM is negative  # Suspicion for cholecystitis on USG  - HIDA scan negative, patient with no pain or tenderness on exam - Elevated LFTs - Outpatient lap lizzie with surgery   # Lung nodule - incidental  - Ct chest 11.2024 :8 mm solid nodule, left lower lobe. - Follow-up CT scan 6-12 months time.  # Lupus anticoagulant +  - on DRVVT, neg silica clotting - b2 glycoprotein, cardiolipin neg   - plan to Repeat APS blood work in    Labs reviewed, platelet remains stable, ADAMTS 13: 96 on Bravo 3 Continue with current dose of steroid 10 mg until surgery (wouldn't taper for now) Will discuss with Dr. Heard and Surgery regarding the timing of the surgery. Given he still has RUQ tenderness, would not wait too long for the surgery.

## 2025-01-31 NOTE — HISTORY OF PRESENT ILLNESS
[ECOG Performance Status: 0 - Fully active, able to carry on all pre-disease performance without restriction] : Performance Status: 0 - Fully active, able to carry on all pre-disease performance without restriction [de-identified] : Mr Abraham is a 76M who presents for initial visit for new Dx of TTP at [facility].   Dx with TTP during hospitalization at [facility]. ADAMTS <1%, + inhibitor.  Received about 13 PLEX sessions, 4cycles of rituximab, stress dose steroids. Hospital course complicated by seizure requiring intubation to protect airway, acute/subacute stroke, Cholecystitis. Discharged with stable levels of plt, on prednisone taper.   Social history: no alcohol/drug use. IADL's  Family history: none significant  [de-identified] : 12.18.24 Mr Nj comes for initial visit after his recent dc from [facility] for TTP/MAHA. He is feeling well overall. He reports some exertional shortness of breath, fatigue.  CBC 12.18.24 : Hb 11.5,    1/6/25 Patient is here for a follow-up visit for TTP/MAHA.  He was admitted last week due to acute abdominal discomfort.  He recieved a HIDA scan on 01/02 which demonstrated normal gallbladder biliary tree and intestinal activity. Surgery was consulted, and are not recommending inpatient cholecystectomy. Pt is to follow-up o/p for CCY.   He was discharged two days ago on prednisone at 15mg daily again (received prednisone at 50mg then 20mg in the hospital).  Reviewed most recent CBC, which shows hgb 12.1g/dL + PLTs 260,000.  Patient denies fever, chills, nausea, vomiting, dyspnea, abdominal discomfort or bleeding.  1/22/25: Overall he is doing fine. Stil has some RUQ tenderness on deep palpation. Was seen by Surgery earlier today and is planning for cholecystectomy. Mental status remains stable.

## 2025-01-31 NOTE — CONSULT LETTER
[Dear  ___] : Dear  [unfilled], [Consult Letter:] : I had the pleasure of evaluating your patient, [unfilled]. [Please see my note below.] : Please see my note below. [Sincerely,] : Sincerely, [FreeTextEntry3] : Jose Nam DO Attending Physician, Hematology/ Medical Oncology [phone] office

## 2025-02-06 NOTE — ASSESSMENT
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 Hospitalization course was complicated by stroke, seizure, QUINCY, cholecysitis. - [id number] <1 %, [id number]  inhibitor positive - S/p eculizumab overnight on 11/15 for treatment of presumptive HUS, no further dose administered -PLEX around 13 sessions. Last session on 12.3.24  - s/p Rituximab 4cycles on 11/21, 11/28, 12/5, 12/14 - s/p methylprednisolone 1g X 3days during hospitalization followed by taper - [id number]  1/3 is 96 Currently on prednisone 10 mg (for last 9 days)  # Acute subacute infarcts: - MRI reviewed:Small acute/subacute infarcts right centrum semiovale, right splenium of the corpus callosum, left subinsular regio Remote hemorrhage left occipital lobe.Chronic inferior cerebellar infarction. Moderate microvascular ischemic changes - Evaluated by neurology- likely 2/2 TTP, embolic to be considered as well.  - Started on aspirin,statin by neurology, Has no focal deficits Follow up with neurology  # Right ventricle linear density: - Concern for vegetation in ECHO - KAM is negative  # Suspicion for cholecystitis on USG  - HIDA scan negative, patient with no pain or tenderness on exam - Elevated LFTs - Outpatient lap lizzie with surgery   # Lung nodule - incidental  - Ct chest 11.2024 :8 mm solid nodule, left lower lobe. - Follow-up CT scan 6-12 months time.  # Lupus anticoagulant +  - on DRVVT, neg silica clotting - b2 glycoprotein, cardiolipin neg   - plan to Repeat APS blood work in    Labs reviewed, platelet remains stable, ADAMTS 13: 96 on Bravo 3 2/6/25: pt was supposed to continue prednisone 10mg daily prior to the  cholecystectomy (scheduled on 2/13/25); he stopped it on 2/1/25; the labs are fine; would not reinitiate it ; however, recommend to get prednisone 5mg daily x7 beginning the day of surgery (no strong data; however, concerned that surgery can provoke TTP; will follow labs weekly after surgery

## 2025-02-06 NOTE — CONSULT LETTER
[Dear  ___] : Dear  [unfilled], [Consult Letter:] : I had the pleasure of evaluating your patient, [unfilled]. [Please see my note below.] : Please see my note below. [Sincerely,] : Sincerely, [FreeTextEntry3] : Jose Nam DO Attending Physician, Hematology/ Medical Oncology [phone] office

## 2025-02-06 NOTE — HISTORY OF PRESENT ILLNESS
[ECOG Performance Status: 0 - Fully active, able to carry on all pre-disease performance without restriction] : Performance Status: 0 - Fully active, able to carry on all pre-disease performance without restriction [de-identified] : Mr Abraham is a 76M who presents for initial visit for new Dx of TTP at [facility].   Dx with TTP during hospitalization at [facility]. ADAMTS <1%, + inhibitor.  Received about 13 PLEX sessions, 4cycles of rituximab, stress dose steroids. Hospital course complicated by seizure requiring intubation to protect airway, acute/subacute stroke, Cholecystitis. Discharged with stable levels of plt, on prednisone taper.   Social history: no alcohol/drug use. IADL's  Family history: none significant  [de-identified] : 12.18.24 Mr Nj comes for initial visit after his recent dc from [facility] for TTP/MAHA. He is feeling well overall. He reports some exertional shortness of breath, fatigue.  CBC 12.18.24 : Hb 11.5,    1/6/25 Patient is here for a follow-up visit for TTP/MAHA.  He was admitted last week due to acute abdominal discomfort.  He recieved a HIDA scan on 01/02 which demonstrated normal gallbladder biliary tree and intestinal activity. Surgery was consulted, and are not recommending inpatient cholecystectomy. Pt is to follow-up o/p for CCY.   He was discharged two days ago on prednisone at 15mg daily again (received prednisone at 50mg then 20mg in the hospital).  Reviewed most recent CBC, which shows hgb 12.1g/dL + PLTs 260,000.  Patient denies fever, chills, nausea, vomiting, dyspnea, abdominal discomfort or bleeding.  1/22/25: Overall he is doing fine. Stil has some RUQ tenderness on deep palpation. Was seen by Surgery earlier today and is planning for cholecystectomy. Mental status remains stable.   2/6/25

## 2025-02-06 NOTE — HISTORY OF PRESENT ILLNESS
[ECOG Performance Status: 0 - Fully active, able to carry on all pre-disease performance without restriction] : Performance Status: 0 - Fully active, able to carry on all pre-disease performance without restriction [de-identified] : Mr Abraham is a 76M who presents for initial visit for new Dx of TTP at [facility].   Dx with TTP during hospitalization at [facility]. ADAMTS <1%, + inhibitor.  Received about 13 PLEX sessions, 4cycles of rituximab, stress dose steroids. Hospital course complicated by seizure requiring intubation to protect airway, acute/subacute stroke, Cholecystitis. Discharged with stable levels of plt, on prednisone taper.   Social history: no alcohol/drug use. IADL's  Family history: none significant  [de-identified] : 12.18.24 Mr Nj comes for initial visit after his recent dc from [facility] for TTP/MAHA. He is feeling well overall. He reports some exertional shortness of breath, fatigue.  CBC 12.18.24 : Hb 11.5,    1/6/25 Patient is here for a follow-up visit for TTP/MAHA.  He was admitted last week due to acute abdominal discomfort.  He recieved a HIDA scan on 01/02 which demonstrated normal gallbladder biliary tree and intestinal activity. Surgery was consulted, and are not recommending inpatient cholecystectomy. Pt is to follow-up o/p for CCY.   He was discharged two days ago on prednisone at 15mg daily again (received prednisone at 50mg then 20mg in the hospital).  Reviewed most recent CBC, which shows hgb 12.1g/dL + PLTs 260,000.  Patient denies fever, chills, nausea, vomiting, dyspnea, abdominal discomfort or bleeding.  1/22/25: Overall he is doing fine. Stil has some RUQ tenderness on deep palpation. Was seen by Surgery earlier today and is planning for cholecystectomy. Mental status remains stable.   2/6/25

## 2025-02-06 NOTE — ASSESSMENT
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 Hospitalization course was complicated by stroke, seizure, QUINCY, cholecysitis. - [id number] <1 %, [id number]  inhibitor positive - S/p eculizumab overnight on 11/15 for treatment of presumptive HUS, no further dose administered -PLEX around 13 sessions. Last session on 12.3.24  - s/p Rituximab 4cycles on 11/21, 11/28, 12/5, 12/14 - s/p methylprednisolone 1g X 3days during hospitalization followed by taper - [id number]  1/3 is 96 Currently on prednisone 10 mg (for last 9 days)  # Acute subacute infarcts: - MRI reviewed:Small acute/subacute infarcts right centrum semiovale, right splenium of the corpus callosum, left subinsular regio Remote hemorrhage left occipital lobe.Chronic inferior cerebellar infarction. Moderate microvascular ischemic changes - Evaluated by neurology- likely 2/2 TTP, embolic to be considered as well.  - Started on aspirin,statin by neurology, Has no focal deficits Follow up with neurology  # Right ventricle linear density: - Concern for vegetation in ECHO - KAM is negative  # Suspicion for cholecystitis on USG  - HIDA scan negative, patient with no pain or tenderness on exam - Elevated LFTs - Outpatient lap lizzie with surgery   # Lung nodule - incidental  - Ct chest 11.2024 :8 mm solid nodule, left lower lobe. - Follow-up CT scan 6-12 months time.  # Lupus anticoagulant +  - on DRVVT, neg silica clotting - b2 glycoprotein, cardiolipin neg   - plan to Repeat APS blood work in    Labs reviewed, platelet remains stable, ADAMTS 13: 96 on Bravo 3 2/6/25: pt was supposed to continue prednisone 10mg daily prior to the  cholecystectomy (scheduled on 2/13/25); he stopped it on 2/1/25; the labs are fine; would not reinitiate it ; however, recommend to get prednisone 5mg daily x7 beginning the day of surgery (no strong data; however, concerned that surgery can provoke TTP; will follow labs weekly after surgery

## 2025-02-06 NOTE — CONSULT LETTER
[Dear  ___] : Dear  [unfilled], [Consult Letter:] : I had the pleasure of evaluating your patient, [unfilled]. [Please see my note below.] : Please see my note below. [Sincerely,] : Sincerely, [FreeTextEntry3] : Jose Nam DO Attending Physician, Hematology/ Medical Oncology [phone] office

## 2025-02-27 NOTE — ASSESSMENT
[FreeTextEntry1] : Mr. YESENIA TRAMMELL is a 76 year  old man. He presented to the office for the first time on 01/22/2025 , his primary is TAVON WILLSON . admitted withe severe life threaening complex ttp  was having ruq pain  us showed gallstones  hida was negattive  was readmittd and all imaging us / ct and hida was negative i think he just had a systemic infalmaatoin and that has resoved but he does having ruq pain and tenderness  worsend on feed likley biliary colic   2/26: The patient returns to the office for a post-op visit after his recent robotic cholecystectomy on 2/13. He has no complaints about the surgery. He reports normal appetite, urination, and bowel function. We reviewed local care and activity restrictions. Pathology was noted and discussed. He will return to the office as needed. They are happy with the assessment and plan. All of their questions were answered.   Impression: Healing well s/p robo lizzie  We discussed the importance of close follow up. We informed that he needs to follow up as needed.  We also informed that he could call us if anything changes or has any questions.

## 2025-02-27 NOTE — PHYSICAL EXAM
[de-identified] : Healthy [de-identified] : Soft, non-tender, non-distended. Well-healing port sites. They are clean and dry, with no evidence of hematoma or infection.

## 2025-03-06 NOTE — ASSESSMENT
[FreeTextEntry1] : # NOEMY, TTP,  dx 11/2024  - diagnosed with TTP early November had prolonged hospitalization. Hospitalization course was complicated by stroke, seizure, QUINCY, cholecysitis. - [id number] <1 %, [id number]  inhibitor positive - S/p eculizumab overnight on 11/15 for treatment of presumptive HUS, no further dose administered -PLEX around 13 sessions. Last session on 12.3.24  - s/p Rituximab 4cycles on 11/21, 11/28, 12/5, 12/14 - s/p methylprednisolone 1g X 3days during hospitalization followed by taper - [id number]  1/3 is 96 Currently on prednisone 10 mg (for last 9 days)  # Acute subacute infarcts - MRI reviewed:Small acute/subacute infarcts right centrum semiovale, right splenium of the corpus callosum, left subinsular regio Remote hemorrhage left occipital lobe.Chronic inferior cerebellar infarction. Moderate microvascular ischemic changes - Evaluated by neurology: likely 2/2 TTP, embolic to be considered as well.  - Started on aspirin,statin by neurology, Has no focal deficits - Follow up with neurology  # Right ventricle linear density: - Concern for vegetation in ECHO - KAM is negative  # Suspicion for cholecystitis on USG  - HIDA scan negative, patient with no pain or tenderness on exam - Elevated LFTs - Outpatient lap lizzie with surgery   # Lung nodule - incidental  - Ct chest 11.2024 :8 mm solid nodule, left lower lobe. - Follow-up CT scan 6-12 months time.  # Lupus anticoagulant +  - on DRVVT, neg silica clotting - b2 glycoprotein, cardiolipin neg   - plan to Repeat APS blood work in    s/p cholecystectomy 02/2025 no clinical evidence of TTP based on labs will get ADAMTS 13 continue prednisone 5 mg , every other day for total of 5 more doses and stop f/u given

## 2025-03-06 NOTE — CONSULT LETTER
[Dear  ___] : Dear  [unfilled], [Consult Letter:] : I had the pleasure of evaluating your patient, [unfilled]. [Please see my note below.] : Please see my note below. [Sincerely,] : Sincerely, [FreeTextEntry3] : Jose Nam DO Attending Physician, Hematology/ Medical Oncology [phone] office

## 2025-03-06 NOTE — ASSESSMENT
[FreeTextEntry1] : # NOEMY, TTP,  dx 11/2024  - diagnosed with TTP early November had prolonged hospitalization. Hospitalization course was complicated by stroke, seizure, QUINCY, cholecysitis. - [id number] <1 %, [id number]  inhibitor positive - S/p eculizumab overnight on 11/15 for treatment of presumptive HUS, no further dose administered -PLEX around 13 sessions. Last session on 12.3.24  - s/p Rituximab 4cycles on 11/21, 11/28, 12/5, 12/14 - s/p methylprednisolone 1g X 3days during hospitalization followed by taper - [id number]  1/3 is 96 Currently on prednisone 10 mg (for last 9 days)  # Acute subacute infarcts - MRI reviewed:Small acute/subacute infarcts right centrum semiovale, right splenium of the corpus callosum, left subinsular regio Remote hemorrhage left occipital lobe.Chronic inferior cerebellar infarction. Moderate microvascular ischemic changes - Evaluated by neurology: likely 2/2 TTP, embolic to be considered as well.  - Started on aspirin,statin by neurology, Has no focal deficits - Follow up with neurology  # Right ventricle linear density: - Concern for vegetation in ECHO - KAM is negative  # Suspicion for cholecystitis on USG  - HIDA scan negative, patient with no pain or tenderness on exam - Elevated LFTs - Outpatient lap lizzie with surgery   # Lung nodule - incidental  - Ct chest 11.2024 :8 mm solid nodule, left lower lobe. - Follow-up CT scan 6-12 months time.  # Lupus anticoagulant +  - on DRVVT, neg silica clotting - b2 glycoprotein, cardiolipin neg   - plan to Repeat APS blood work in    s/p cholecystectomy 02/2025 no clinical evidence of TTP based on labs will get ADAMTS 13 continue prednisone 5 mg , every other day for total of 5 more doses and stop f/u given

## 2025-03-06 NOTE — HISTORY OF PRESENT ILLNESS
[ECOG Performance Status: 0 - Fully active, able to carry on all pre-disease performance without restriction] : Performance Status: 0 - Fully active, able to carry on all pre-disease performance without restriction [de-identified] : Mr Abraham is a 76M who presents for initial visit for new Dx of TTP at [facility].   Dx with TTP during hospitalization at [facility]. ADAMTS <1%, + inhibitor.  Received about 13 PLEX sessions, 4cycles of rituximab, stress dose steroids. Hospital course complicated by seizure requiring intubation to protect airway, acute/subacute stroke, Cholecystitis. Discharged with stable levels of plt, on prednisone taper.   Social history: no alcohol/drug use. IADL's  Family history: none significant  [de-identified] : 12.18.24 Mr Nj comes for initial visit after his recent dc from [facility] for TTP/MAHA. He is feeling well overall. He reports some exertional shortness of breath, fatigue.  CBC 12.18.24 : Hb 11.5,    1/6/25 Patient is here for a follow-up visit for TTP/MAHA.  He was admitted last week due to acute abdominal discomfort.  He recieved a HIDA scan on 01/02 which demonstrated normal gallbladder biliary tree and intestinal activity. Surgery was consulted, and are not recommending inpatient cholecystectomy. Pt is to follow-up o/p for CCY.   He was discharged two days ago on prednisone at 15mg daily again (received prednisone at 50mg then 20mg in the hospital).  Reviewed most recent CBC, which shows hgb 12.1g/dL + PLTs 260,000.  Patient denies fever, chills, nausea, vomiting, dyspnea, abdominal discomfort or bleeding.  1/22/25: Overall he is doing fine. Stil has some RUQ tenderness on deep palpation. Was seen by Surgery earlier today and is planning for cholecystectomy. Mental status remains stable.   2/6/25

## 2025-03-06 NOTE — CONSULT LETTER
[Dear  ___] : Dear  [unfilled], [Consult Letter:] : I had the pleasure of evaluating your patient, [unfilled]. [Please see my note below.] : Please see my note below. [Sincerely,] : Sincerely, [FreeTextEntry3] : Jose Nam DO Attending Physician, Hematology/ Medical Oncology [phone] office

## 2025-03-06 NOTE — HISTORY OF PRESENT ILLNESS
[ECOG Performance Status: 0 - Fully active, able to carry on all pre-disease performance without restriction] : Performance Status: 0 - Fully active, able to carry on all pre-disease performance without restriction [de-identified] : Mr Abraham is a 76M who presents for initial visit for new Dx of TTP at [facility].   Dx with TTP during hospitalization at [facility]. ADAMTS <1%, + inhibitor.  Received about 13 PLEX sessions, 4cycles of rituximab, stress dose steroids. Hospital course complicated by seizure requiring intubation to protect airway, acute/subacute stroke, Cholecystitis. Discharged with stable levels of plt, on prednisone taper.   Social history: no alcohol/drug use. IADL's  Family history: none significant  [de-identified] : 12.18.24 Mr Nj comes for initial visit after his recent dc from [facility] for TTP/MAHA. He is feeling well overall. He reports some exertional shortness of breath, fatigue.  CBC 12.18.24 : Hb 11.5,    1/6/25 Patient is here for a follow-up visit for TTP/MAHA.  He was admitted last week due to acute abdominal discomfort.  He recieved a HIDA scan on 01/02 which demonstrated normal gallbladder biliary tree and intestinal activity. Surgery was consulted, and are not recommending inpatient cholecystectomy. Pt is to follow-up o/p for CCY.   He was discharged two days ago on prednisone at 15mg daily again (received prednisone at 50mg then 20mg in the hospital).  Reviewed most recent CBC, which shows hgb 12.1g/dL + PLTs 260,000.  Patient denies fever, chills, nausea, vomiting, dyspnea, abdominal discomfort or bleeding.  1/22/25: Overall he is doing fine. Stil has some RUQ tenderness on deep palpation. Was seen by Surgery earlier today and is planning for cholecystectomy. Mental status remains stable.   2/6/25

## 2025-04-10 NOTE — REVIEW OF SYSTEMS
[Fatigue] : fatigue [Recent Change In Weight] : ~T recent weight change [SOB on Exertion] : shortness of breath during exertion [Negative] : Heme/Lymph [Easy Bruising] : a tendency for easy bruising [Fever] : no fever [Chills] : no chills [Night Sweats] : no night sweats [Wheezing] : no wheezing [Cough] : no cough [Abdominal Pain] : no abdominal pain [FreeTextEntry2] : weight loss  [FreeTextEntry6] : exertional shortness of breath  [de-identified] : small bruising on upper extremity

## 2025-04-10 NOTE — CONSULT LETTER
[Dear  ___] : Dear  [unfilled], [Consult Letter:] : I had the pleasure of evaluating your patient, [unfilled]. [Please see my note below.] : Please see my note below. [Sincerely,] : Sincerely, [FreeTextEntry3] : Jose Nam DO Attending Physician, Hematology/ Medical Oncology [phone] office

## 2025-04-10 NOTE — PHYSICAL EXAM
[de-identified] : Tenderness on RUQ on deep palpation [Restricted in physically strenuous activity but ambulatory and able to carry out work of a light or sedentary nature] : Status 1- Restricted in physically strenuous activity but ambulatory and able to carry out work of a light or sedentary nature, e.g., light house work, office work [Normal] : normoactive bowel sounds, soft and nontender, no hepatosplenomegaly or masses appreciated

## 2025-04-10 NOTE — HISTORY OF PRESENT ILLNESS
[ECOG Performance Status: 0 - Fully active, able to carry on all pre-disease performance without restriction] : Performance Status: 0 - Fully active, able to carry on all pre-disease performance without restriction [de-identified] : Mr Abraham is a 76M who presents for initial visit for new Dx of TTP at [facility].   Dx with TTP during hospitalization at [facility]. ADAMTS <1%, + inhibitor.  Received about 13 PLEX sessions, 4cycles of rituximab, stress dose steroids. Hospital course complicated by seizure requiring intubation to protect airway, acute/subacute stroke, Cholecystitis. Discharged with stable levels of plt, on prednisone taper.   Social history: no alcohol/drug use. IADL's  Family history: none significant  [de-identified] : 12.18.24 Mr Nj comes for initial visit after his recent dc from [facility] for TTP/MAHA. He is feeling well overall. He reports some exertional shortness of breath, fatigue.  CBC 12.18.24 : Hb 11.5,    1/6/25 Patient is here for a follow-up visit for TTP/MAHA.  He was admitted last week due to acute abdominal discomfort.  He recieved a HIDA scan on 01/02 which demonstrated normal gallbladder biliary tree and intestinal activity. Surgery was consulted, and are not recommending inpatient cholecystectomy. Pt is to follow-up o/p for CCY.   He was discharged two days ago on prednisone at 15mg daily again (received prednisone at 50mg then 20mg in the hospital).  Reviewed most recent CBC, which shows hgb 12.1g/dL + PLTs 260,000.  Patient denies fever, chills, nausea, vomiting, dyspnea, abdominal discomfort or bleeding.  1/22/25: Overall he is doing fine. Stil has some RUQ tenderness on deep palpation. Was seen by Surgery earlier today and is planning for cholecystectomy. Mental status remains stable.   4/10/25 Patient is here for a follow-up visit for TTP/MAHA, accompanied by daughter.  Reviewed most recent CBC, which shows hgb 13.6g/dL + PLTs 222,000.  He reports small bruising on upper extremities.  Patient denies fever, chills, nausea, vomiting, dyspnea, abdominal discomfort or bleeding.

## 2025-04-10 NOTE — CONSULT LETTER
[Dear  ___] : Dear  [unfilled], [Consult Letter:] : I had the pleasure of evaluating your patient, [unfilled]. [Please see my note below.] : Please see my note below. [Sincerely,] : Sincerely, [FreeTextEntry3] : Jose Nam DO Attending Physician, Hematology/ Medical Oncology [phone] office

## 2025-04-10 NOTE — CONSULT LETTER
[Dear  ___] : Dear  [unfilled], [Consult Letter:] : I had the pleasure of evaluating your patient, [unfilled]. [Please see my note below.] : Please see my note below. [Sincerely,] : Sincerely, [FreeTextEntry3] : Jose Nam DO Attending Physician, Hematology/ Medical Oncology [phone] office

## 2025-04-10 NOTE — HISTORY OF PRESENT ILLNESS
[ECOG Performance Status: 0 - Fully active, able to carry on all pre-disease performance without restriction] : Performance Status: 0 - Fully active, able to carry on all pre-disease performance without restriction [de-identified] : Mr Abraham is a 76M who presents for initial visit for new Dx of TTP at [facility].   Dx with TTP during hospitalization at [facility]. ADAMTS <1%, + inhibitor.  Received about 13 PLEX sessions, 4cycles of rituximab, stress dose steroids. Hospital course complicated by seizure requiring intubation to protect airway, acute/subacute stroke, Cholecystitis. Discharged with stable levels of plt, on prednisone taper.   Social history: no alcohol/drug use. IADL's  Family history: none significant  [de-identified] : 12.18.24 Mr Nj comes for initial visit after his recent dc from [facility] for TTP/MAHA. He is feeling well overall. He reports some exertional shortness of breath, fatigue.  CBC 12.18.24 : Hb 11.5,    1/6/25 Patient is here for a follow-up visit for TTP/MAHA.  He was admitted last week due to acute abdominal discomfort.  He recieved a HIDA scan on 01/02 which demonstrated normal gallbladder biliary tree and intestinal activity. Surgery was consulted, and are not recommending inpatient cholecystectomy. Pt is to follow-up o/p for CCY.   He was discharged two days ago on prednisone at 15mg daily again (received prednisone at 50mg then 20mg in the hospital).  Reviewed most recent CBC, which shows hgb 12.1g/dL + PLTs 260,000.  Patient denies fever, chills, nausea, vomiting, dyspnea, abdominal discomfort or bleeding.  1/22/25: Overall he is doing fine. Stil has some RUQ tenderness on deep palpation. Was seen by Surgery earlier today and is planning for cholecystectomy. Mental status remains stable.   4/10/25 Patient is here for a follow-up visit for TTP/MAHA, accompanied by daughter.  Reviewed most recent CBC, which shows hgb 13.6g/dL + PLTs 222,000.  He reports small bruising on upper extremities.  Patient denies fever, chills, nausea, vomiting, dyspnea, abdominal discomfort or bleeding.

## 2025-04-10 NOTE — REVIEW OF SYSTEMS
[Fatigue] : fatigue [Recent Change In Weight] : ~T recent weight change [SOB on Exertion] : shortness of breath during exertion [Negative] : Heme/Lymph [Easy Bruising] : a tendency for easy bruising [Fever] : no fever [Chills] : no chills [Night Sweats] : no night sweats [Wheezing] : no wheezing [Cough] : no cough [Abdominal Pain] : no abdominal pain [FreeTextEntry2] : weight loss  [FreeTextEntry6] : exertional shortness of breath  [de-identified] : small bruising on upper extremity

## 2025-04-10 NOTE — HISTORY OF PRESENT ILLNESS
[ECOG Performance Status: 0 - Fully active, able to carry on all pre-disease performance without restriction] : Performance Status: 0 - Fully active, able to carry on all pre-disease performance without restriction [de-identified] : Mr Abraham is a 76M who presents for initial visit for new Dx of TTP at [facility].   Dx with TTP during hospitalization at [facility]. ADAMTS <1%, + inhibitor.  Received about 13 PLEX sessions, 4cycles of rituximab, stress dose steroids. Hospital course complicated by seizure requiring intubation to protect airway, acute/subacute stroke, Cholecystitis. Discharged with stable levels of plt, on prednisone taper.   Social history: no alcohol/drug use. IADL's  Family history: none significant  [de-identified] : 12.18.24 Mr Nj comes for initial visit after his recent dc from [facility] for TTP/MAHA. He is feeling well overall. He reports some exertional shortness of breath, fatigue.  CBC 12.18.24 : Hb 11.5,    1/6/25 Patient is here for a follow-up visit for TTP/MAHA.  He was admitted last week due to acute abdominal discomfort.  He recieved a HIDA scan on 01/02 which demonstrated normal gallbladder biliary tree and intestinal activity. Surgery was consulted, and are not recommending inpatient cholecystectomy. Pt is to follow-up o/p for CCY.   He was discharged two days ago on prednisone at 15mg daily again (received prednisone at 50mg then 20mg in the hospital).  Reviewed most recent CBC, which shows hgb 12.1g/dL + PLTs 260,000.  Patient denies fever, chills, nausea, vomiting, dyspnea, abdominal discomfort or bleeding.  1/22/25: Overall he is doing fine. Stil has some RUQ tenderness on deep palpation. Was seen by Surgery earlier today and is planning for cholecystectomy. Mental status remains stable.   4/10/25 Patient is here for a follow-up visit for TTP/MAHA, accompanied by daughter.  Reviewed most recent CBC, which shows hgb 13.6g/dL + PLTs 222,000.  He reports small bruising on upper extremities.  Patient denies fever, chills, nausea, vomiting, dyspnea, abdominal discomfort or bleeding.

## 2025-04-10 NOTE — CONSULT LETTER
[Dear  ___] : Dear  [unfilled], [Consult Letter:] : I had the pleasure of evaluating your patient, [unfilled]. [Please see my note below.] : Please see my note below. [Sincerely,] : Sincerely, [FreeTextEntry3] : Jose Nam DO Attending Physician, Hematology/ Medical Oncology [phone] office

## 2025-04-10 NOTE — REVIEW OF SYSTEMS
[Fatigue] : fatigue [Recent Change In Weight] : ~T recent weight change [SOB on Exertion] : shortness of breath during exertion [Negative] : Heme/Lymph [Easy Bruising] : a tendency for easy bruising [Fever] : no fever [Chills] : no chills [Night Sweats] : no night sweats [Wheezing] : no wheezing [Cough] : no cough [Abdominal Pain] : no abdominal pain [FreeTextEntry2] : weight loss  [FreeTextEntry6] : exertional shortness of breath  [de-identified] : small bruising on upper extremity

## 2025-04-10 NOTE — PHYSICAL EXAM
[de-identified] : Tenderness on RUQ on deep palpation [Restricted in physically strenuous activity but ambulatory and able to carry out work of a light or sedentary nature] : Status 1- Restricted in physically strenuous activity but ambulatory and able to carry out work of a light or sedentary nature, e.g., light house work, office work [Normal] : normoactive bowel sounds, soft and nontender, no hepatosplenomegaly or masses appreciated

## 2025-04-10 NOTE — REVIEW OF SYSTEMS
[Fatigue] : fatigue [Recent Change In Weight] : ~T recent weight change [SOB on Exertion] : shortness of breath during exertion [Negative] : Heme/Lymph [Easy Bruising] : a tendency for easy bruising [Fever] : no fever [Chills] : no chills [Night Sweats] : no night sweats [Wheezing] : no wheezing [Cough] : no cough [Abdominal Pain] : no abdominal pain [FreeTextEntry2] : weight loss  [FreeTextEntry6] : exertional shortness of breath  [de-identified] : small bruising on upper extremity

## 2025-04-10 NOTE — ASSESSMENT
[FreeTextEntry1] : # ONEMY, TTP,  dx 11/2024  - diagnosed with TTP early November had prolonged hospitalization. Hospitalization course was complicated by stroke, seizure, QUINCY, cholecysitis. - [id number] <1 %, [id number]  inhibitor positive - S/p eculizumab overnight on 11/15 for treatment of presumptive HUS, no further dose administered -PLEX around 13 sessions. Last session on 12.3.24  - s/p Rituximab 4 cycles on 11/21, 11/28, 12/5, 12/14 - s/p methylprednisolone 1g X 3days during hospitalization followed by taper - continue on close observation  - Labwork today: LDH, [id number] level   # Acute subacute infarcts - MRI reviewed:Small acute/subacute infarcts right centrum semiovale, right splenium of the corpus callosum, left subinsular regio Remote hemorrhage left occipital lobe.Chronic inferior cerebellar infarction. Moderate microvascular ischemic changes - Evaluated by neurology: likely 2/2 TTP, embolic to be considered as well.  - Started on aspirin, statin by neurology, Has no focal deficits - Follow up with neurology   # Lung nodule - incidental  - Ct chest 11.2024 :8 mm solid nodule, left lower lobe. - Follow-up CT scan 6-12 months time.  # Lupus anticoagulant +  - on DRVVT, neg silica clotting  - b2 glycoprotein, cardiolipin neg  - plan to Repeat APS blood work in 3M   Labwork monthly: CBC, BMP, LFTs, LDH, retic count, [id number] RTC in 2 months with CBC, BMP, LFTs, LDH, retic count, [id number] level 1 week prior  seen examined w/ NP AMANDEEP Hyde note reviewed; case discussed; agree w/ the plan

## 2025-04-10 NOTE — ASSESSMENT
[FreeTextEntry1] : # NOEMY, TTP,  dx 11/2024  - diagnosed with TTP early November had prolonged hospitalization. Hospitalization course was complicated by stroke, seizure, QUINCY, cholecysitis. - [id number] <1 %, [id number]  inhibitor positive - S/p eculizumab overnight on 11/15 for treatment of presumptive HUS, no further dose administered -PLEX around 13 sessions. Last session on 12.3.24  - s/p Rituximab 4 cycles on 11/21, 11/28, 12/5, 12/14 - s/p methylprednisolone 1g X 3days during hospitalization followed by taper - continue on close observation  - Labwork today: LDH, [id number] level   # Acute subacute infarcts - MRI reviewed:Small acute/subacute infarcts right centrum semiovale, right splenium of the corpus callosum, left subinsular regio Remote hemorrhage left occipital lobe.Chronic inferior cerebellar infarction. Moderate microvascular ischemic changes - Evaluated by neurology: likely 2/2 TTP, embolic to be considered as well.  - Started on aspirin, statin by neurology, Has no focal deficits - Follow up with neurology   # Lung nodule - incidental  - Ct chest 11.2024 :8 mm solid nodule, left lower lobe. - Follow-up CT scan 6-12 months time.  # Lupus anticoagulant +  - on DRVVT, neg silica clotting  - b2 glycoprotein, cardiolipin neg  - plan to Repeat APS blood work in 3M   Labwork monthly: CBC, BMP, LFTs, LDH, retic count, [id number] RTC in 2 months with CBC, BMP, LFTs, LDH, retic count, [id number] level 1 week prior  seen examined w/ NP AMANDEEP Hyde note reviewed; case discussed; agree w/ the plan

## 2025-04-10 NOTE — HISTORY OF PRESENT ILLNESS
[ECOG Performance Status: 0 - Fully active, able to carry on all pre-disease performance without restriction] : Performance Status: 0 - Fully active, able to carry on all pre-disease performance without restriction [de-identified] : Mr Abraham is a 76M who presents for initial visit for new Dx of TTP at [facility].   Dx with TTP during hospitalization at [facility]. ADAMTS <1%, + inhibitor.  Received about 13 PLEX sessions, 4cycles of rituximab, stress dose steroids. Hospital course complicated by seizure requiring intubation to protect airway, acute/subacute stroke, Cholecystitis. Discharged with stable levels of plt, on prednisone taper.   Social history: no alcohol/drug use. IADL's  Family history: none significant  [de-identified] : 12.18.24 Mr Nj comes for initial visit after his recent dc from [facility] for TTP/MAHA. He is feeling well overall. He reports some exertional shortness of breath, fatigue.  CBC 12.18.24 : Hb 11.5,    1/6/25 Patient is here for a follow-up visit for TTP/MAHA.  He was admitted last week due to acute abdominal discomfort.  He recieved a HIDA scan on 01/02 which demonstrated normal gallbladder biliary tree and intestinal activity. Surgery was consulted, and are not recommending inpatient cholecystectomy. Pt is to follow-up o/p for CCY.   He was discharged two days ago on prednisone at 15mg daily again (received prednisone at 50mg then 20mg in the hospital).  Reviewed most recent CBC, which shows hgb 12.1g/dL + PLTs 260,000.  Patient denies fever, chills, nausea, vomiting, dyspnea, abdominal discomfort or bleeding.  1/22/25: Overall he is doing fine. Stil has some RUQ tenderness on deep palpation. Was seen by Surgery earlier today and is planning for cholecystectomy. Mental status remains stable.   4/10/25 Patient is here for a follow-up visit for TTP/MAHA, accompanied by daughter.  Reviewed most recent CBC, which shows hgb 13.6g/dL + PLTs 222,000.  He reports small bruising on upper extremities.  Patient denies fever, chills, nausea, vomiting, dyspnea, abdominal discomfort or bleeding.

## 2025-04-10 NOTE — ASSESSMENT
[FreeTextEntry1] : # NOEMY, TTP,  dx 11/2024  - diagnosed with TTP early November had prolonged hospitalization. Hospitalization course was complicated by stroke, seizure, QUINCY, cholecysitis. - [id number] <1 %, [id number]  inhibitor positive - S/p eculizumab overnight on 11/15 for treatment of presumptive HUS, no further dose administered -PLEX around 13 sessions. Last session on 12.3.24  - s/p Rituximab 4 cycles on 11/21, 11/28, 12/5, 12/14 - s/p methylprednisolone 1g X 3days during hospitalization followed by taper - continue on close observation  - Labwork today: LDH, [id number] level   # Acute subacute infarcts - MRI reviewed:Small acute/subacute infarcts right centrum semiovale, right splenium of the corpus callosum, left subinsular regio Remote hemorrhage left occipital lobe.Chronic inferior cerebellar infarction. Moderate microvascular ischemic changes - Evaluated by neurology: likely 2/2 TTP, embolic to be considered as well.  - Started on aspirin, statin by neurology, Has no focal deficits - Follow up with neurology   # Lung nodule - incidental  - Ct chest 11.2024 :8 mm solid nodule, left lower lobe. - Follow-up CT scan 6-12 months time.  # Lupus anticoagulant +  - on DRVVT, neg silica clotting  - b2 glycoprotein, cardiolipin neg  - plan to Repeat APS blood work in 3M   Labwork monthly: CBC, BMP, LFTs, LDH, retic count, [id number] RTC in 2 months with CBC, BMP, LFTs, LDH, retic count, [id number] level 1 week prior  seen examined w/ NP AMANDEEP Hyde note reviewed; case discussed; agree w/ the plan

## 2025-04-10 NOTE — PHYSICAL EXAM
[de-identified] : Tenderness on RUQ on deep palpation [Restricted in physically strenuous activity but ambulatory and able to carry out work of a light or sedentary nature] : Status 1- Restricted in physically strenuous activity but ambulatory and able to carry out work of a light or sedentary nature, e.g., light house work, office work [Normal] : normoactive bowel sounds, soft and nontender, no hepatosplenomegaly or masses appreciated

## 2025-04-10 NOTE — ASSESSMENT
[FreeTextEntry1] : # NOEMY, TTP,  dx 11/2024  - diagnosed with TTP early November had prolonged hospitalization. Hospitalization course was complicated by stroke, seizure, QUINCY, cholecysitis. - [id number] <1 %, [id number]  inhibitor positive - S/p eculizumab overnight on 11/15 for treatment of presumptive HUS, no further dose administered -PLEX around 13 sessions. Last session on 12.3.24  - s/p Rituximab 4 cycles on 11/21, 11/28, 12/5, 12/14 - s/p methylprednisolone 1g X 3days during hospitalization followed by taper - continue on close observation  - Labwork today: LDH, [id number] level   # Acute subacute infarcts - MRI reviewed:Small acute/subacute infarcts right centrum semiovale, right splenium of the corpus callosum, left subinsular regio Remote hemorrhage left occipital lobe.Chronic inferior cerebellar infarction. Moderate microvascular ischemic changes - Evaluated by neurology: likely 2/2 TTP, embolic to be considered as well.  - Started on aspirin, statin by neurology, Has no focal deficits - Follow up with neurology   # Lung nodule - incidental  - Ct chest 11.2024 :8 mm solid nodule, left lower lobe. - Follow-up CT scan 6-12 months time.  # Lupus anticoagulant +  - on DRVVT, neg silica clotting  - b2 glycoprotein, cardiolipin neg  - plan to Repeat APS blood work in 3M   Labwork monthly: CBC, BMP, LFTs, LDH, retic count, [id number] RTC in 2 months with CBC, BMP, LFTs, LDH, retic count, [id number] level 1 week prior  seen examined w/ NP AMANDEEP Hyde note reviewed; case discussed; agree w/ the plan